# Patient Record
Sex: FEMALE | Race: OTHER | Employment: FULL TIME | URBAN - METROPOLITAN AREA
[De-identification: names, ages, dates, MRNs, and addresses within clinical notes are randomized per-mention and may not be internally consistent; named-entity substitution may affect disease eponyms.]

---

## 2018-09-21 PROCEDURE — 87502 INFLUENZA DNA AMP PROBE: CPT | Performed by: INTERNAL MEDICINE

## 2018-09-21 PROCEDURE — 36415 COLL VENOUS BLD VENIPUNCTURE: CPT | Performed by: INTERNAL MEDICINE

## 2018-09-21 PROCEDURE — 87798 DETECT AGENT NOS DNA AMP: CPT | Performed by: INTERNAL MEDICINE

## 2020-07-08 NOTE — PROGRESS NOTES
Outpatient Maternal-Fetal Medicine Consultation    Dear Dr. Georgi Martínez,    Thank you for requesting ultrasound evaluation and maternal fetal medicine consultation on your patient Divina Masters.   As you are aware she is a 27year old female with a Singl Scan:  Moca gestation.   Biometry:  BPD 49.9 mm 71st% 21w1d (20w4d to 21w5d)  .1 mm 59th% 21w0d (19w3d to 22w3d)  .8 mm 77th% 21w4d (20w6d to 22w2d)  FL 32.8 mm 30th% 20w2d (18w3d to 22w0d)  OFD 66.9 mm 90th% 21w4d  TCD 22.8 mm 87th% 21w6d discussed and reviewed the following issues:  ADVANCED MATERNAL AGE    Background  I reviewed with the patient that pregnancies in women of advanced maternal age (28 or older at delivery) are associated with elevated risks.  Specifically, there is a higher analysis tool using data from the [place] [employer]  Database predicted a strategy of weekly antepartum testing and labor induction would lower the risk of unexplained fetal death in women 28years of age or older.  In this model, weekly testing st testing. Non-invasive Pregnancy Testing (NIPT) - Low risk NIPT screen    We discussed her low risk cell free DNA screen and her normal level II ultrasound today.   We discussed her option for amniocentesis but that the likelihood of finding a genetic co Kelsea  Follow-up Growth ultrasound at 32 weeks. Weekly NST's at 36 weeks  If there is progression of the renal dilation, additional follow up recommendations will be made    Thank you for allowing me to participate in the care of your patient.

## 2020-07-15 ENCOUNTER — OFFICE VISIT (OUTPATIENT)
Dept: PERINATAL CARE | Facility: HOSPITAL | Age: 35
End: 2020-07-15
Attending: OBSTETRICS & GYNECOLOGY
Payer: COMMERCIAL

## 2020-07-15 VITALS
BODY MASS INDEX: 24.8 KG/M2 | HEIGHT: 63 IN | SYSTOLIC BLOOD PRESSURE: 103 MMHG | HEART RATE: 80 BPM | WEIGHT: 140 LBS | DIASTOLIC BLOOD PRESSURE: 66 MMHG

## 2020-07-15 DIAGNOSIS — O09.522 MULTIGRAVIDA OF ADVANCED MATERNAL AGE IN SECOND TRIMESTER: ICD-10-CM

## 2020-07-15 DIAGNOSIS — O35.8XX0 PYELECTASIS OF FETUS ON PRENATAL ULTRASOUND: ICD-10-CM

## 2020-07-15 PROCEDURE — 76811 OB US DETAILED SNGL FETUS: CPT | Performed by: OBSTETRICS & GYNECOLOGY

## 2020-07-15 PROCEDURE — 99244 OFF/OP CNSLTJ NEW/EST MOD 40: CPT | Performed by: OBSTETRICS & GYNECOLOGY

## 2020-09-22 NOTE — PROGRESS NOTES
Lexii Peña  Dear Dr. Corwin Garcia,     Thank you for requesting ultrasound evaluation and maternal fetal medicine consultation on your patient Divina Masters.   As you are aware she is a 38year old female  with values have been used to define pyelectasis:      · >4mm at 15-26weeks  · >7mm at >26 weeks     Fetal kidneys appear normal today therefore no additional follow-up is needed       We reviewed the signs and symptoms of preeclampsia,  labor and monito

## 2020-10-06 ENCOUNTER — ULTRASOUND ENCOUNTER (OUTPATIENT)
Dept: PERINATAL CARE | Facility: HOSPITAL | Age: 35
End: 2020-10-06
Attending: OBSTETRICS & GYNECOLOGY
Payer: COMMERCIAL

## 2020-10-06 VITALS
WEIGHT: 153 LBS | HEIGHT: 63 IN | BODY MASS INDEX: 27.11 KG/M2 | HEART RATE: 92 BPM | SYSTOLIC BLOOD PRESSURE: 123 MMHG | DIASTOLIC BLOOD PRESSURE: 80 MMHG

## 2020-10-06 DIAGNOSIS — O35.8XX0 ENCOUNTER FOR REPEAT ULTRASOUND OF FETAL PYELECTASIS IN SINGLETON PREGNANCY, ANTEPARTUM: ICD-10-CM

## 2020-10-06 DIAGNOSIS — O09.522 MULTIGRAVIDA OF ADVANCED MATERNAL AGE IN SECOND TRIMESTER: ICD-10-CM

## 2020-10-06 DIAGNOSIS — O09.522 MULTIGRAVIDA OF ADVANCED MATERNAL AGE IN SECOND TRIMESTER: Primary | ICD-10-CM

## 2020-10-06 PROCEDURE — 76819 FETAL BIOPHYS PROFIL W/O NST: CPT | Performed by: OBSTETRICS & GYNECOLOGY

## 2020-10-06 PROCEDURE — 99213 OFFICE O/P EST LOW 20 MIN: CPT | Performed by: OBSTETRICS & GYNECOLOGY

## 2020-10-06 PROCEDURE — 76819 FETAL BIOPHYS PROFIL W/O NST: CPT

## 2020-10-06 PROCEDURE — 76816 OB US FOLLOW-UP PER FETUS: CPT | Performed by: OBSTETRICS & GYNECOLOGY

## 2020-10-06 RX ORDER — CHOLECALCIFEROL (VITAMIN D3) 25 MCG
1 TABLET,CHEWABLE ORAL DAILY
COMMUNITY

## 2020-11-13 ENCOUNTER — HOSPITAL ENCOUNTER (INPATIENT)
Facility: HOSPITAL | Age: 35
LOS: 1 days | Discharge: HOME | End: 2020-11-14
Attending: OBSTETRICS & GYNECOLOGY | Admitting: OBSTETRICS & GYNECOLOGY
Payer: COMMERCIAL

## 2020-11-13 ENCOUNTER — ANESTHESIA (OUTPATIENT)
Dept: OBGYN UNIT | Facility: HOSPITAL | Age: 35
End: 2020-11-13
Payer: COMMERCIAL

## 2020-11-13 ENCOUNTER — ANESTHESIA EVENT (OUTPATIENT)
Dept: OBGYN UNIT | Facility: HOSPITAL | Age: 35
End: 2020-11-13
Payer: COMMERCIAL

## 2020-11-13 PROBLEM — Z34.90 PREGNANCY: Status: ACTIVE | Noted: 2020-11-13

## 2020-11-13 PROCEDURE — 99214 OFFICE O/P EST MOD 30 MIN: CPT

## 2020-11-13 PROCEDURE — 86780 TREPONEMA PALLIDUM: CPT | Performed by: OBSTETRICS & GYNECOLOGY

## 2020-11-13 PROCEDURE — 85025 COMPLETE CBC W/AUTO DIFF WBC: CPT | Performed by: OBSTETRICS & GYNECOLOGY

## 2020-11-13 PROCEDURE — 86901 BLOOD TYPING SEROLOGIC RH(D): CPT | Performed by: OBSTETRICS & GYNECOLOGY

## 2020-11-13 PROCEDURE — 86850 RBC ANTIBODY SCREEN: CPT | Performed by: OBSTETRICS & GYNECOLOGY

## 2020-11-13 PROCEDURE — 86900 BLOOD TYPING SEROLOGIC ABO: CPT | Performed by: OBSTETRICS & GYNECOLOGY

## 2020-11-13 PROCEDURE — 0KQM0ZZ REPAIR PERINEUM MUSCLE, OPEN APPROACH: ICD-10-PCS | Performed by: OBSTETRICS & GYNECOLOGY

## 2020-11-13 RX ORDER — TRISODIUM CITRATE DIHYDRATE AND CITRIC ACID MONOHYDRATE 500; 334 MG/5ML; MG/5ML
30 SOLUTION ORAL AS NEEDED
Status: DISCONTINUED | OUTPATIENT
Start: 2020-11-13 | End: 2020-11-13 | Stop reason: HOSPADM

## 2020-11-13 RX ORDER — IBUPROFEN 600 MG/1
600 TABLET ORAL EVERY 6 HOURS
Status: DISCONTINUED | OUTPATIENT
Start: 2020-11-13 | End: 2020-11-14

## 2020-11-13 RX ORDER — ONDANSETRON 2 MG/ML
4 INJECTION INTRAMUSCULAR; INTRAVENOUS EVERY 6 HOURS PRN
Status: DISCONTINUED | OUTPATIENT
Start: 2020-11-13 | End: 2020-11-13 | Stop reason: HOSPADM

## 2020-11-13 RX ORDER — BISACODYL 10 MG
10 SUPPOSITORY, RECTAL RECTAL ONCE AS NEEDED
Status: DISCONTINUED | OUTPATIENT
Start: 2020-11-13 | End: 2020-11-14

## 2020-11-13 RX ORDER — AMMONIA INHALANTS 0.04 G/.3ML
0.3 INHALANT RESPIRATORY (INHALATION) AS NEEDED
Status: DISCONTINUED | OUTPATIENT
Start: 2020-11-13 | End: 2020-11-13 | Stop reason: HOSPADM

## 2020-11-13 RX ORDER — ACETAMINOPHEN 325 MG/1
650 TABLET ORAL EVERY 6 HOURS PRN
Status: DISCONTINUED | OUTPATIENT
Start: 2020-11-13 | End: 2020-11-14

## 2020-11-13 RX ORDER — SODIUM CHLORIDE, SODIUM LACTATE, POTASSIUM CHLORIDE, CALCIUM CHLORIDE 600; 310; 30; 20 MG/100ML; MG/100ML; MG/100ML; MG/100ML
INJECTION, SOLUTION INTRAVENOUS CONTINUOUS
Status: DISCONTINUED | OUTPATIENT
Start: 2020-11-13 | End: 2020-11-14

## 2020-11-13 RX ORDER — DIPHENHYDRAMINE HYDROCHLORIDE 50 MG/ML
12.5 INJECTION INTRAMUSCULAR; INTRAVENOUS EVERY 4 HOURS PRN
Status: DISCONTINUED | OUTPATIENT
Start: 2020-11-13 | End: 2020-11-14

## 2020-11-13 RX ORDER — IBUPROFEN 600 MG/1
600 TABLET ORAL EVERY 6 HOURS PRN
Status: DISCONTINUED | OUTPATIENT
Start: 2020-11-13 | End: 2020-11-13 | Stop reason: HOSPADM

## 2020-11-13 RX ORDER — FEBUXOSTAT 40 MG/1
40 TABLET, FILM COATED ORAL DAILY
Status: ON HOLD | COMMUNITY
End: 2020-11-13 | Stop reason: CLARIF

## 2020-11-13 RX ORDER — ACETAMINOPHEN 500 MG
500 TABLET ORAL EVERY 6 HOURS PRN
Status: DISCONTINUED | OUTPATIENT
Start: 2020-11-13 | End: 2020-11-13 | Stop reason: HOSPADM

## 2020-11-13 RX ORDER — MULTIVIT-MIN/IRON/FOLIC ACID/K 18-600-40
CAPSULE ORAL
COMMUNITY
End: 2020-12-12 | Stop reason: ALTCHOICE

## 2020-11-13 RX ORDER — TERBUTALINE SULFATE 1 MG/ML
0.25 INJECTION, SOLUTION SUBCUTANEOUS AS NEEDED
Status: DISCONTINUED | OUTPATIENT
Start: 2020-11-13 | End: 2020-11-13 | Stop reason: HOSPADM

## 2020-11-13 RX ORDER — MELATONIN
325
COMMUNITY
End: 2020-12-12 | Stop reason: ALTCHOICE

## 2020-11-13 RX ORDER — BUPIVACAINE HCL/0.9 % NACL/PF 0.25 %
5 PLASTIC BAG, INJECTION (ML) EPIDURAL AS NEEDED
Status: DISCONTINUED | OUTPATIENT
Start: 2020-11-13 | End: 2020-11-14

## 2020-11-13 RX ORDER — SIMETHICONE 80 MG
80 TABLET,CHEWABLE ORAL 3 TIMES DAILY PRN
Status: DISCONTINUED | OUTPATIENT
Start: 2020-11-13 | End: 2020-11-14

## 2020-11-13 RX ORDER — DOCUSATE SODIUM 100 MG/1
100 CAPSULE, LIQUID FILLED ORAL
Status: DISCONTINUED | OUTPATIENT
Start: 2020-11-13 | End: 2020-11-14

## 2020-11-13 NOTE — PLAN OF CARE
Problem: Patient/Family Goals  Goal: Patient/Family Short Term Goal  Description: Patient's Short Term Goal: effective pain control    Interventions:     Problem: Patient/Family Goals  Goal: Patient/Family Long Term Goal  Description: Patient's Long Term

## 2020-11-13 NOTE — H&P
[facility]    PATIENT'S NAME: Edris Lefort, ASHA   ATTENDING PHYSICIAN: Vu Harkins M.D.    PATIENT ACCOUNT#:   [de-identified]    LOCATION:  [address] [place] [address]  MEDICAL RECORD #:   [id number]       YOB: 1985  ADMISSION DATE:       11/ trimester HIV nonreactive. Her ultrasound at 36 weeks showed the baby weighed approximately 6 pounds 14 ounces, right renal pelvis measured 12 mm on the right sign. GBS negative.     OBSTETRIC HISTORY:  Significant for 08/04/2013, she had a 39-week 6 poun 10:24:09  t: 11/13/2020 10:58:53  Job [id number]  /

## 2020-11-13 NOTE — PROCEDURES
Vaginal Delivery Note          Kevin Ards Patient Status:  Inpatient    1985 MRN [id number]   Location [address] [place] [address] Attending [facility] [place] [facility] [place] [address], [facility] Ezra Rodriguez 647 Day # 0 PCP MD  11/13/2020  7:30 AM

## 2020-11-13 NOTE — PLAN OF CARE
Problem: SAFETY ADULT - FALL  Goal: Free from fall injury  Description: INTERVENTIONS:  - Assess pt frequently for physical needs  - Identify cognitive and physical deficits and behaviors that affect risk of falls.   - [place] fall precautions as indica

## 2020-11-13 NOTE — PROGRESS NOTES
NURSING ADMISSION NOTE    Patient admitted via wheelchair. Oriented to room. Safety precautions initiated. Bed in low position. Call light in reach. Both mom/ infant ID bands verified. Hugs and kisses on. [place] safety reviewed.

## 2020-11-13 NOTE — ANESTHESIA PROCEDURE NOTES
Labor Analgesia  Performed by: Carol Le MD  Authorized by: Carol Le MD       General Information and Staff    Start Time:  11/13/2020 2:14 AM  Anesthesiologist:  Carol Le MD  Performed by:   Anesthesiologist  Patient Location:  OB

## 2020-11-13 NOTE — ANESTHESIA PREPROCEDURE EVALUATION
PRE-OP EVALUATION    Patient Name: Reinier Leal    Pre-op Diagnosis: * No pre-op diagnosis entered *    * No procedures listed *    * No surgeons found in log *    Pre-op vitals reviewed. Temp: 98.3 °F (36.8 °C)     Body mass index is 28.34 kg/m².     Rafal Simpson History    Tobacco Use      Smoking status: Never Smoker      Smokeless tobacco: Never Used    Alcohol use: No      Drug use: No     Available pre-op labs reviewed.                Airway      Mallampati: II  Mouth opening: 3 FB  TM distance: 4 - 6 cm  Neck

## 2020-11-13 NOTE — L&D DELIVERY NOTE
Ancelmo Hook Nestor [[id number]]    Labor Events     labor?: No   steroids?: None  Antibiotics received during labor?: No  Rupture date/time: 2020     Rupture type: SROM  Fluid color: Clear  Induction: None  Augmentation: None  Intrapart cry; hypoventilation Good, crying              1 Minute:  5 Minute:  10 Minute:  15 Minute:  20 Minute:    Skin color: 1  1       Heart rate: 2  2       Reflex irritablity: 2  2       Muscle tone: 2  2       Respiratory effort: 2  2       Total: 9  9

## 2020-11-13 NOTE — PROGRESS NOTES
Pt up to br, tolerated well. Mare care done, pads/panties/gown changed. To mother baby room 3629 via w/c holding infant, with RN and spouse.  Report updated to [place], RN and ID bands verified x 2 RN's

## 2020-11-14 VITALS
RESPIRATION RATE: 17 BRPM | HEIGHT: 63 IN | DIASTOLIC BLOOD PRESSURE: 73 MMHG | TEMPERATURE: 98 F | SYSTOLIC BLOOD PRESSURE: 110 MMHG | BODY MASS INDEX: 28.35 KG/M2 | WEIGHT: 160 LBS | HEART RATE: 73 BPM | OXYGEN SATURATION: 100 %

## 2020-11-14 PROBLEM — Z34.90 PREGNANCY: Status: RESOLVED | Noted: 2020-11-13 | Resolved: 2020-11-14

## 2020-11-14 PROCEDURE — 85025 COMPLETE CBC W/AUTO DIFF WBC: CPT | Performed by: OBSTETRICS & GYNECOLOGY

## 2020-11-14 NOTE — PROGRESS NOTES
[facility]  Post-Partum Vaginal Delivery Progress Note    Unknown Pili Masters Patient Status:  Inpatient    1985 MRN [id number]   [facility] 2SW-J Attending Erika Alert Day # 1 PCP Glorya Peabody, MD     SUBJECTIVE:

## 2020-11-14 NOTE — PROGRESS NOTES
Labor Analgesia Follow Up Note    Patient underwent epidural anesthesia for labor analgesia,    Placenta Date/Time: 11/13/2020  7:16 AM    Delivery Date/Time[de-identified] 11/13/2020  7:07 AM    /73   Pulse 73   Temp 97.9 °F (36.6 °C) (Axillary)   Resp 17   Ht 1

## 2020-11-15 NOTE — PROGRESS NOTES
Discharge patient home as order. Teaching complete, patient feel comfortable to take care herself and  infant. Hugs and kisses off. Sent both mom and infant to their family car @ 1.

## 2020-11-17 ENCOUNTER — TELEPHONE (OUTPATIENT)
Dept: OBGYN UNIT | Facility: HOSPITAL | Age: 35
End: 2020-11-17

## 2020-12-12 ENCOUNTER — APPOINTMENT (OUTPATIENT)
Dept: CT IMAGING | Facility: HOSPITAL | Age: 35
DRG: 776 | End: 2020-12-12
Attending: EMERGENCY MEDICINE
Payer: COMMERCIAL

## 2020-12-12 ENCOUNTER — HOSPITAL ENCOUNTER (INPATIENT)
Facility: HOSPITAL | Age: 35
LOS: 4 days | Discharge: HOME | DRG: 776 | End: 2020-12-16
Attending: EMERGENCY MEDICINE | Admitting: INTERNAL MEDICINE
Payer: COMMERCIAL

## 2020-12-12 DIAGNOSIS — N39.0 SEPSIS DUE TO URINARY TRACT INFECTION (HCC): Primary | ICD-10-CM

## 2020-12-12 DIAGNOSIS — A41.9 SEPSIS DUE TO URINARY TRACT INFECTION (HCC): Primary | ICD-10-CM

## 2020-12-12 DIAGNOSIS — N12 PYELONEPHRITIS: ICD-10-CM

## 2020-12-12 PROCEDURE — 87186 SC STD MICRODIL/AGAR DIL: CPT | Performed by: EMERGENCY MEDICINE

## 2020-12-12 PROCEDURE — 87086 URINE CULTURE/COLONY COUNT: CPT | Performed by: EMERGENCY MEDICINE

## 2020-12-12 PROCEDURE — 87077 CULTURE AEROBIC IDENTIFY: CPT | Performed by: EMERGENCY MEDICINE

## 2020-12-12 PROCEDURE — 96366 THER/PROPH/DIAG IV INF ADDON: CPT

## 2020-12-12 PROCEDURE — 74176 CT ABD & PELVIS W/O CONTRAST: CPT | Performed by: EMERGENCY MEDICINE

## 2020-12-12 PROCEDURE — 83605 ASSAY OF LACTIC ACID: CPT | Performed by: EMERGENCY MEDICINE

## 2020-12-12 PROCEDURE — 81025 URINE PREGNANCY TEST: CPT

## 2020-12-12 PROCEDURE — 99285 EMERGENCY DEPT VISIT HI MDM: CPT

## 2020-12-12 PROCEDURE — 85025 COMPLETE CBC W/AUTO DIFF WBC: CPT | Performed by: EMERGENCY MEDICINE

## 2020-12-12 PROCEDURE — 81001 URINALYSIS AUTO W/SCOPE: CPT | Performed by: EMERGENCY MEDICINE

## 2020-12-12 PROCEDURE — 87040 BLOOD CULTURE FOR BACTERIA: CPT | Performed by: EMERGENCY MEDICINE

## 2020-12-12 PROCEDURE — 96361 HYDRATE IV INFUSION ADD-ON: CPT

## 2020-12-12 PROCEDURE — 80053 COMPREHEN METABOLIC PANEL: CPT | Performed by: EMERGENCY MEDICINE

## 2020-12-12 PROCEDURE — 87150 DNA/RNA AMPLIFIED PROBE: CPT | Performed by: EMERGENCY MEDICINE

## 2020-12-12 PROCEDURE — 96365 THER/PROPH/DIAG IV INF INIT: CPT

## 2020-12-12 RX ORDER — POTASSIUM CHLORIDE 20 MEQ/1
40 TABLET, EXTENDED RELEASE ORAL EVERY 4 HOURS
Status: DISPENSED | OUTPATIENT
Start: 2020-12-12 | End: 2020-12-13

## 2020-12-12 RX ORDER — SODIUM PHOSPHATE, DIBASIC AND SODIUM PHOSPHATE, MONOBASIC 7; 19 G/133ML; G/133ML
1 ENEMA RECTAL ONCE AS NEEDED
Status: DISCONTINUED | OUTPATIENT
Start: 2020-12-12 | End: 2020-12-16

## 2020-12-12 RX ORDER — ACETAMINOPHEN 325 MG/1
650 TABLET ORAL EVERY 6 HOURS PRN
Status: DISCONTINUED | OUTPATIENT
Start: 2020-12-12 | End: 2020-12-16

## 2020-12-12 RX ORDER — SODIUM CHLORIDE 9 MG/ML
INJECTION, SOLUTION INTRAVENOUS CONTINUOUS
Status: DISCONTINUED | OUTPATIENT
Start: 2020-12-12 | End: 2020-12-16

## 2020-12-12 RX ORDER — MORPHINE SULFATE 4 MG/ML
2 INJECTION, SOLUTION INTRAMUSCULAR; INTRAVENOUS EVERY 2 HOUR PRN
Status: DISCONTINUED | OUTPATIENT
Start: 2020-12-12 | End: 2020-12-16

## 2020-12-12 RX ORDER — POLYETHYLENE GLYCOL 3350 17 G/17G
17 POWDER, FOR SOLUTION ORAL DAILY PRN
Status: DISCONTINUED | OUTPATIENT
Start: 2020-12-12 | End: 2020-12-16

## 2020-12-12 RX ORDER — ACETAMINOPHEN 500 MG
1000 TABLET ORAL ONCE
Status: COMPLETED | OUTPATIENT
Start: 2020-12-12 | End: 2020-12-12

## 2020-12-12 RX ORDER — MORPHINE SULFATE 4 MG/ML
4 INJECTION, SOLUTION INTRAMUSCULAR; INTRAVENOUS EVERY 2 HOUR PRN
Status: DISCONTINUED | OUTPATIENT
Start: 2020-12-12 | End: 2020-12-16

## 2020-12-12 RX ORDER — ONDANSETRON 2 MG/ML
4 INJECTION INTRAMUSCULAR; INTRAVENOUS EVERY 6 HOURS PRN
Status: DISCONTINUED | OUTPATIENT
Start: 2020-12-12 | End: 2020-12-16

## 2020-12-12 RX ORDER — BISACODYL 10 MG
10 SUPPOSITORY, RECTAL RECTAL
Status: DISCONTINUED | OUTPATIENT
Start: 2020-12-12 | End: 2020-12-16

## 2020-12-12 RX ORDER — SODIUM CHLORIDE 9 MG/ML
INJECTION, SOLUTION INTRAVENOUS CONTINUOUS
Status: ACTIVE | OUTPATIENT
Start: 2020-12-12 | End: 2020-12-13

## 2020-12-12 RX ORDER — ONDANSETRON 2 MG/ML
4 INJECTION INTRAMUSCULAR; INTRAVENOUS EVERY 4 HOURS PRN
Status: DISCONTINUED | OUTPATIENT
Start: 2020-12-12 | End: 2020-12-12

## 2020-12-12 RX ORDER — MORPHINE SULFATE 4 MG/ML
1 INJECTION, SOLUTION INTRAMUSCULAR; INTRAVENOUS EVERY 2 HOUR PRN
Status: DISCONTINUED | OUTPATIENT
Start: 2020-12-12 | End: 2020-12-16

## 2020-12-12 RX ORDER — DOCUSATE SODIUM 100 MG/1
100 CAPSULE, LIQUID FILLED ORAL 2 TIMES DAILY
Status: DISCONTINUED | OUTPATIENT
Start: 2020-12-12 | End: 2020-12-16

## 2020-12-12 RX ORDER — HEPARIN SODIUM 5000 [USP'U]/ML
5000 INJECTION, SOLUTION INTRAVENOUS; SUBCUTANEOUS EVERY 12 HOURS SCHEDULED
Status: DISCONTINUED | OUTPATIENT
Start: 2020-12-12 | End: 2020-12-16

## 2020-12-12 NOTE — ED INITIAL ASSESSMENT (HPI)
Pt here for fever  Per pt, \"I had chills yesterday and started with fever today (oo=934.5) and I took tylenol which brought it down. Last weekend, I was having some pain with urination and urge to pee.  I started drinking lots of water and the pain went aw

## 2020-12-13 PROCEDURE — 85027 COMPLETE CBC AUTOMATED: CPT | Performed by: HOSPITALIST

## 2020-12-13 PROCEDURE — 84132 ASSAY OF SERUM POTASSIUM: CPT | Performed by: INTERNAL MEDICINE

## 2020-12-13 PROCEDURE — 80053 COMPREHEN METABOLIC PANEL: CPT | Performed by: HOSPITALIST

## 2020-12-13 PROCEDURE — 84132 ASSAY OF SERUM POTASSIUM: CPT | Performed by: HOSPITALIST

## 2020-12-13 RX ORDER — ACETAMINOPHEN 10 MG/ML
1000 INJECTION, SOLUTION INTRAVENOUS ONCE
Status: COMPLETED | OUTPATIENT
Start: 2020-12-13 | End: 2020-12-13

## 2020-12-13 RX ORDER — POTASSIUM CHLORIDE 20 MEQ/1
40 TABLET, EXTENDED RELEASE ORAL EVERY 4 HOURS
Status: COMPLETED | OUTPATIENT
Start: 2020-12-13 | End: 2020-12-13

## 2020-12-13 NOTE — CONSULTS
INFECTIOUS DISEASE CONSULTATION    Deborah Masters Patient Status:  Inpatient    1985 MRN [id number]   [facility] 0SW-A Attending Marcin Velasco MD   [facility] Day # 1 PCP Ilda Yen MD dextrose 5 % 100 mL ADD-vantage, 3.375 g, Intravenous, Q8H  No current facility-administered medications on file prior to encounter. •  prenatal multivitamin plus DHA 27-0.8-228 MG Oral Cap, Take 1 capsule by mouth daily. , Disp: , Rfl:         Review o ([address]     Pyelonephritis    ASSESSMENT/PLAN:  1. Pyelonephritis, E.  Coli bacteremia  CT showing inflammation but no stones or hydro\  Potential risk of ESBL, emigrant from [address] [place] [address] pending sensitivities        MD EVAN Leblanc

## 2020-12-13 NOTE — PROGRESS NOTES
NURSING ADMISSION NOTE      Patient admitted via Cart  Oriented to room. Safety precautions initiated. Bed in low position. Call light in reach. Pt arrived onto floor in stable condition at 2230 from ER. VSS. Denies pain, n/v at this time.  Denies

## 2020-12-13 NOTE — PROGRESS NOTES
Patient admitted to room via cart  Oriented to call light, room, plan of care  VSS, navigator complete  Breast pump requested from mother baby. Patient educated on milk storage, confirmed storage safety with mother/baby RN.    designated as care par

## 2020-12-13 NOTE — PLAN OF CARE
A&Ox4. VSS. Tele- NSR. Tolerating diet. Denies pain. Abdomen soft. Hypoactive bowel sounds. IVF infusing. Reviewed plan of care. .  1018 Paged Dr. Mena Gaucher with blood culture results. He said he would contact ID.     5647 Patient stated she felt warm.  Tem activity based on assessment  - Modify environment to reduce risk of injury  - Provide assistive devices as appropriate  - Consider OT/PT consult to assist with strengthening/mobility  - Encourage toileting schedule  Outcome: Progressing     Problem: [facility]

## 2020-12-13 NOTE — H&P
[facility]    History and Physical     Kacy Masters Patient Status:  Inpatient    1985 MRN [id number]   [facility] 0SW-A Attending Jai Baker MD   [facility]osp Day # 1 PCP Dayami Stanford MD     Chief Complaint: fever, chills and bod systems was completed. Pertinent positives and negatives noted in the HPI.     Physical Exam:    BP 93/56 (BP Location: Left arm)   Pulse (!) 126   Temp 98.6 °F (37 °C) (Oral)   Resp 18   Wt 135 lb (61.2 kg)   LMP 02/22/2020   SpO2 97%   Breastfeeding Stephanie Norris now    Sepsis  -pt with leukocytosis, tachycardia, hypotension, ua abn and likely pyelo  -iv abx as above  -ivf, bp and hr responded  -cx pending    Quality:  · DVT Prophylaxis: heparin sq  · CODE status: Full code  · POA is her     Plan of care dis

## 2020-12-13 NOTE — ED PROVIDER NOTES
Patient Seen in: [facility] Emergency Department      History   Patient presents with:  Urinary Symptoms    Stated Complaint: urinary sx, chills    HPI    18-year-old otherwise healthy female, postpartum 1 month, presents today for fevers.   Patient Musculoskeletal: Neck supple. Cardiovascular:      Rate and Rhythm: Regular rhythm. Tachycardia present. Pulmonary:      Effort: Pulmonary effort is normal.      Breath sounds: Normal breath sounds. Abdominal:      Palpations: Abdomen is soft.       Sabina Valencia ---------                               -----------         ------                     CBC W/ DIFFERENTIAL[[id number]]          Abnormal            Final result                 Please view results for these tests on the individual orders.    POCT CONCLUSION:  1. Negative for obstructing or nonobstructing renal calculus disease. 2. Inflammatory changes are seen around the right kidney and ureter. Differential considerations include a recently passed stone.   However no stone is seen in th diagnosis)  Pyelonephritis    Disposition:  Admit  12/12/2020  9:52 pm    Follow-up:  No follow-up provider specified.         Medications Prescribed:  Current Discharge Medication List                          Present on Admission  Date Reviewed: 10/6/2020

## 2020-12-14 PROCEDURE — 83735 ASSAY OF MAGNESIUM: CPT | Performed by: HOSPITALIST

## 2020-12-14 PROCEDURE — 87493 C DIFF AMPLIFIED PROBE: CPT | Performed by: HOSPITALIST

## 2020-12-14 PROCEDURE — 85025 COMPLETE CBC W/AUTO DIFF WBC: CPT | Performed by: HOSPITALIST

## 2020-12-14 PROCEDURE — 80048 BASIC METABOLIC PNL TOTAL CA: CPT | Performed by: HOSPITALIST

## 2020-12-14 RX ORDER — POTASSIUM CHLORIDE 20 MEQ/1
40 TABLET, EXTENDED RELEASE ORAL ONCE
Status: COMPLETED | OUTPATIENT
Start: 2020-12-14 | End: 2020-12-14

## 2020-12-14 NOTE — CM/SW NOTE
SW completed a chart review to identify needs and provide discharge planning if needed. SW identified that pt lives at home with her  and 2 children. Pt delivered via csection on November 13, 2020.      No discharge needs identified at this time b

## 2020-12-14 NOTE — PLAN OF CARE
Pt a/ox4, room air, tele ST, ivf and abx infusing, voids, denies pain n/v at this time. Bowel sounds present, lungs clear bilat,. Tmax 102 - Tylenol given. Pt compliant w/ poc. Call light within reach, will continue to monitor.      Problem: Patient/Family INTERVENTIONS:  - Assess pt frequently for physical needs  - Identify cognitive and physical deficits and behaviors that affect risk of falls.   - [place] fall precautions as indicated by assessment.  - Educate pt/family on patient safety including physic

## 2020-12-14 NOTE — PROGRESS NOTES
[facility] Hospitalist Progress Note                                                                   [facility] Sonam  8/19/1985    SUBJECTIVE: No chest pain, palpitations, shortness of breath, cou medical problems although is 1 month post partum presenting with fever, chills, bodyaches.      Acute Pyelonephritis  E. Coli Bacteremia  -blood cx pos  -urine cx pending  -ID consulted  -zosyn DAY #2  -fever 102.9 at 8 pm last night  -prefer 24 hours no fe

## 2020-12-14 NOTE — PLAN OF CARE
A&Ox4. VSS. Denies pain. Tolerating diet. Patient having loose stool. Sent sample for C. Diff. IVF infusing. Reviewed plan of care.        Problem: Patient/Family Goals  Goal: Patient/Family Long Term Goal  Description: Patient's Long Term Goal: Discharge h strengthening/mobility  - Encourage toileting schedule  Outcome: Progressing     Problem: DISCHARGE PLANNING  Goal: Discharge to home or other facility with appropriate resources  Description: INTERVENTIONS:  - Identify barriers to discharge w/pt and careg

## 2020-12-15 PROCEDURE — 80048 BASIC METABOLIC PNL TOTAL CA: CPT | Performed by: HOSPITALIST

## 2020-12-15 PROCEDURE — 85025 COMPLETE CBC W/AUTO DIFF WBC: CPT | Performed by: HOSPITALIST

## 2020-12-15 RX ORDER — ERTAPENEM 1 G/1
1 INJECTION, POWDER, LYOPHILIZED, FOR SOLUTION INTRAMUSCULAR; INTRAVENOUS DAILY
Qty: 100 ML | Refills: 0 | Status: SHIPPED | OUTPATIENT
Start: 2020-12-15 | End: 2020-12-16

## 2020-12-15 RX ORDER — POTASSIUM CHLORIDE 20 MEQ/1
40 TABLET, EXTENDED RELEASE ORAL ONCE
Status: COMPLETED | OUTPATIENT
Start: 2020-12-15 | End: 2020-12-15

## 2020-12-15 NOTE — PROGRESS NOTES
Pt resting in bed, easy non labored breathing on ra. Vs wnl. Up ad camilo steady gait. Voiding without difficulty. Iv fluids infusing. Pt tolerating regular diet. Pt pumping and saving breast milk in ice container next to bedside.  Pm meds admin plan of care d

## 2020-12-15 NOTE — PROGRESS NOTES
INFECTIOUS DISEASE PROGRESS NOTE    Esequiel Masters Patient Status:  Inpatient    1985 MRN [id number]   [facility] 0SW-A Attending Jud Ley MD   [facility] Day # 3 PCP Aleah Greene MD systems was completed. Pertinent positives and negatives noted in the the HPI. Physical Exam:    General: No acute distress. Alert and oriented x 3. Resting comfortably in bed.   Vital signs: Temp:  [97.4 °F (36.3 °C)-100.9 °F (38.3 °C)] 98.2 °F (36.8 ° Cefepime  Resistant      Ceftazidime  Resistant      Ceftriaxone >=64 Resistant      Ciprofloxacin >=4 Resistant      Gentamicin <=1 Sensitive      Meropenem <=0.25 Sensitive      Levofloxacin >=8 Resistant      Piperacillin + Tazobactam <=4 Sensitive

## 2020-12-15 NOTE — PAYOR COMM NOTE
--------------  ADMISSION REVIEW     Payor: JOSE DAVID CRAIG  Subscriber #:  [id number]  Authorization Number: [id number]    Admit date: 12/12/20  Admit time: 26       Patient Seen in: [facility] Emergency Department    History   Stated Complaint: urinary General: No focal deficit present. Mental Status: She is alert. Cranial Nerves: No cranial nerve deficit. Sensory: No sensory deficit. Motor: No weakness.       Coordination: Coordination normal.     Labs Reviewed   URINALYSIS WITH CULTU send CBC, lactic acid, and blood culture to assess for sepsis. Plan for UA to confirm my suspicion of UTI. Will check electrolytes to assess for electrolyte abnormality. Plan for CT of the abdomen to assess for ureterolithiasis.   Plan for fluids and kimberley BP 93/56 (BP Location: Left arm)   Pulse (!) 126   Temp 98.6 °F (37 °C) (Oral)   Resp 18   Wt 135 lb (61.2 kg)   LMP 02/22/2020   SpO2 97%   Breastfeeding Yes   BMI 23.91 kg/m²     Lab 12/12/20  1846 12/13/20  0451   WBC 20.7* 19.6*   HGB 13.8 12.0   MCV 12/14/20  0431   WBC 20.7* 19.6* 12.2*   HGB 13.8 12.0 11.3*   MCV 80.2 80.6 81.5   .0 236.0 220.0     Lab 12/12/20  1846 12/13/20  0451 12/13/20  1126 12/13/20 2000 12/14/20  0431    138  --   --  137   K 3.2* 3.9 3.6 4.1 3.8    108  - HCT 35.8   MCV 79.6*   MCH 26.2   MCHC 33.0   RDW 14.0   NEPRELIM 7.30   WBC 9.9   .0     ASSESSMENT/PLAN:  1.  Pyelonephritis with E. Coli ESBL bacteremia  - CT showing inflammation but no stones or hydro  - follow temps  - continue meropenem  - p

## 2020-12-15 NOTE — PLAN OF CARE
Patient A/O X 4, VSS, IVF infusing per orders. Meropenem started, handout given to patient about ESBL, MEROPENEM. Potassium given per electrolyte protocol.      Problem: Patient/Family Goals  Goal: Patient/Family Long Term Goal  Description: Patient's Long strengthening/mobility  - Encourage toileting schedule  Outcome: Progressing     Problem: DISCHARGE PLANNING  Goal: Discharge to home or other facility with appropriate resources  Description: INTERVENTIONS:  - Identify barriers to discharge w/pt and careg

## 2020-12-16 VITALS
DIASTOLIC BLOOD PRESSURE: 70 MMHG | SYSTOLIC BLOOD PRESSURE: 122 MMHG | OXYGEN SATURATION: 98 % | RESPIRATION RATE: 16 BRPM | HEART RATE: 58 BPM | BODY MASS INDEX: 24 KG/M2 | TEMPERATURE: 97 F | WEIGHT: 135 LBS

## 2020-12-16 PROCEDURE — 85025 COMPLETE CBC W/AUTO DIFF WBC: CPT | Performed by: HOSPITALIST

## 2020-12-16 PROCEDURE — 76937 US GUIDE VASCULAR ACCESS: CPT

## 2020-12-16 PROCEDURE — 05HF33Z INSERTION OF INFUSION DEVICE INTO LEFT CEPHALIC VEIN, PERCUTANEOUS APPROACH: ICD-10-PCS | Performed by: INTERNAL MEDICINE

## 2020-12-16 PROCEDURE — 36410 VNPNXR 3YR/> PHY/QHP DX/THER: CPT

## 2020-12-16 PROCEDURE — 3E03329 INTRODUCTION OF OTHER ANTI-INFECTIVE INTO PERIPHERAL VEIN, PERCUTANEOUS APPROACH: ICD-10-PCS | Performed by: INTERNAL MEDICINE

## 2020-12-16 PROCEDURE — 85027 COMPLETE CBC AUTOMATED: CPT | Performed by: HOSPITALIST

## 2020-12-16 PROCEDURE — 80048 BASIC METABOLIC PNL TOTAL CA: CPT | Performed by: HOSPITALIST

## 2020-12-16 PROCEDURE — 85007 BL SMEAR W/DIFF WBC COUNT: CPT | Performed by: HOSPITALIST

## 2020-12-16 RX ORDER — POTASSIUM CHLORIDE 20 MEQ/1
40 TABLET, EXTENDED RELEASE ORAL EVERY 4 HOURS
Status: COMPLETED | OUTPATIENT
Start: 2020-12-16 | End: 2020-12-16

## 2020-12-16 RX ORDER — ERTAPENEM 1 G/1
1 INJECTION, POWDER, LYOPHILIZED, FOR SOLUTION INTRAMUSCULAR; INTRAVENOUS DAILY
Qty: 70 ML | Refills: 0 | Status: SHIPPED | OUTPATIENT
Start: 2020-12-16 | End: 2020-12-23

## 2020-12-16 NOTE — CM/SW NOTE
SW spoke to [facility] (ph: [phone]). IV ABX to be delivered to the home [facility] through [facility] [phone]. AVS updated.     Derek Toro MSW, LCSW   at [facility]  Ph: [phone] or Mehrdad Yu Se

## 2020-12-16 NOTE — CM/SW NOTE
SW noted pt is to discharge home with IV abx. SW awaiting orders to arrange for home IV abx if needed. GABRIEL updated RN.     Candy Masters MSW, LCSW   at [facility]  Ph: [phone] or Mehrdad Yu Se

## 2020-12-16 NOTE — PLAN OF CARE
Written and verbal discharge instructions given to patient and verbalize understanding. Prescription given. Veriried with lactation RN that pt can continue breast feeding while on iv abx. Patient left floor in stable condition  accompanied by spouse.

## 2020-12-16 NOTE — PAYOR COMM NOTE
--------------  CONTINUED STAY REVIEW    Payor: [facility]  Subscriber #:  [id number]  Authorization Number: [id number]    Admit date: 12/12/20  Admit time: 26    Admitting Physician: Edis Corrales MD  Attending Physician:  Audrey Ivan DO •  Potassium Chloride ER (K-DUR M20) CR tab 40 mEq, 40 mEq, Oral, Q4H  •  Meropenem (MERREM) 500 mg in sodium chloride 0.9% 100 mL MBP, 500 mg, Intravenous, Q8H  •  0.9% NaCl infusion, , Intravenous, Continuous  •  Heparin Sodium (Porcine) 5000 UNIT/ML inj Laboratory Data:  Laboratory data reviewed            Recent Labs   Lab 12/16/20  0454   RBC 4.50   HGB 11.8*   HCT 35.4   MCV 78.7*   MCH 26.2   MCHC 33.3   RDW 14.1   NEPRELIM 7.08   WBC 10.2   .0                  Recent Labs   Lab 12/12/20  2037   Escherichia coli  ESBL Pos -  (no method available)       Cefazolin >=64 Resistant         Cefepime >=64 Resistant         Ceftazidime   Resistant         Ceftriaxone >=64 Resistant         Ciprofloxacin >=4 Resistant         Gentamicin <=1 Sensitive

## 2020-12-16 NOTE — PROGRESS NOTES
INFECTIOUS DISEASE PROGRESS NOTE    Keya Lopez Sonam Patient Status:  Inpatient    1985 MRN [id number]   [facility] 0SW-A Attending Antonio De Leon MD   [address] Day # 4 PCP Shelley Mccoy MD HPI.    Physical Exam:    General: No acute distress. Alert and oriented x 3. Resting comfortably in bed.   Vital signs: Temp:  [98 °F (36.7 °C)-99.3 °F (37.4 °C)] 99.2 °F (37.3 °C)  Pulse:  [69-84] 81  Resp:  [18] 18  BP: (110-124)/(61-77) 110/67  HEENT: M Ciprofloxacin >=4 Resistant      Gentamicin <=1 Sensitive      Meropenem <=0.25 Sensitive      Levofloxacin >=8 Resistant      Piperacillin + Tazobactam <=4 Sensitive      Trimethoprim/Sulfa <=20 Sensitive    2.  URINE CULTURE, ROUTINE     Status: Abnormal

## 2020-12-16 NOTE — PROGRESS NOTES
Pt resting in bed, easy non labored breathing on ra. Vs wnl. Up ad camilo. Steady gait. Voids without difficulty. Pt tolerating regular diet. Iv fluids infusing without difficulty. Pm meds admin plan of care discussed. Instructed to call if any needs arise.

## 2020-12-16 NOTE — DISCHARGE SUMMARY
General Medicine Discharge Summary     Patient ID:  lEsa Masters  38year old  8/19/1985    Admit date: 12/12/2020    Discharge date and time: 12/16/2020  1:13 PM     Attending Physician: Marquise Tavarez have CHANGED    ertapenem 1 g Injection Recon Soln  Inject 10 mL (1 g total) into the vein daily for 7 days. , Print Script, Disp-70 mL, R-0      CONTINUE these medications which have NOT CHANGED    prenatal multivitamin plus DHA 27-0.8-228 MG Oral Cap  Federico

## 2020-12-16 NOTE — PROGRESS NOTES
[facility] Hospitalist Progress Note                                                                   [facility] Sonam  8/19/1985    SUBJECTIVE: No chest pain, palpitations, shortness of breath, cou bisacodyl, Fleet Enema, ondansetron HCl    ASSESSMENT / PLAN:   38year old female with no major medical problems although is 1 month post partum presenting with fever, chills, bodyaches.      Acute Pyelonephritis  E. Coli Bacteremia  -blood cx pos  -urine

## 2020-12-17 NOTE — PAYOR COMM NOTE
--------------  DISCHARGE REVIEW    Payor: JOSE DAVID CRAIG  Subscriber #:  [id number]  Authorization Number: [id number]    Admit date: 12/12/20  Admit time:  26  Discharge Date: 12/16/2020  1:13 PM     Admitting Physician: Marthenia Goodell, MD  Attending Bacteremia  -blood cx pos  -urine cx pending--> ESBL e.coli  -ID following  -zosyn DAY #3 --> meropenem day #1 now and invanz on discharge to continue through 12/25 ( 10 days)     Sepsis  -pt with leukocytosis, tachycardia, hypotension, ua abn and likely p

## (undated) NOTE — LETTER
Misti Johnson 182  [address] [facility] [address] [facility]  Authorization for Surgical Operation and Procedure     Date:___________                                                                                                         Time:__________ 4.   Should the need arise during my operation or immediate post-operative period, I also consent to the administration of blood and/or blood products.   Further, I understand that despite careful testing and screening of blood or blood products by lalit 8.   I recognize that in the event my procedure results in extended X-Ray/fluoroscopy time, I may develop a skin reaction. 9.  If I have a Do Not Attempt Resuscitation (DNAR) order in place, that status will be suspended while in the operating room, proc 1. IDivina agree to be cared for by an anesthesiologist, who is specially trained to monitor me and give me medicine to put me to sleep or keep me comfortable during my procedure.     I understand that my anesthesiologist is not an employee or agent 5. My doctor has explained to me other choices available to me for my care (alternatives).     6. Pregnant Patients (“epidural”):  I understand that the risks of having an epidural (medicine given into my back to help control pain during labor), include itc